# Patient Record
Sex: MALE | Race: WHITE | Employment: OTHER | URBAN - METROPOLITAN AREA
[De-identification: names, ages, dates, MRNs, and addresses within clinical notes are randomized per-mention and may not be internally consistent; named-entity substitution may affect disease eponyms.]

---

## 2018-01-01 ENCOUNTER — TELEPHONE (OUTPATIENT)
Dept: FAMILY MEDICINE | Facility: CLINIC | Age: 63
End: 2018-01-01

## 2018-01-01 ENCOUNTER — PATIENT OUTREACH (OUTPATIENT)
Dept: CARE COORDINATION | Facility: CLINIC | Age: 63
End: 2018-01-01

## 2018-01-01 ENCOUNTER — HOSPITAL ENCOUNTER (OUTPATIENT)
Facility: CLINIC | Age: 63
Setting detail: OBSERVATION
Discharge: HOME | End: 2018-10-17
Attending: EMERGENCY MEDICINE | Admitting: FAMILY MEDICINE
Payer: COMMERCIAL

## 2018-01-01 ENCOUNTER — TELEPHONE (OUTPATIENT)
Dept: PALLIATIVE MEDICINE | Facility: CLINIC | Age: 63
End: 2018-01-01

## 2018-01-01 ENCOUNTER — DOCUMENTATION ONLY (OUTPATIENT)
Dept: OTHER | Facility: CLINIC | Age: 63
End: 2018-01-01

## 2018-01-01 ENCOUNTER — APPOINTMENT (OUTPATIENT)
Dept: GENERAL RADIOLOGY | Facility: CLINIC | Age: 63
End: 2018-01-01
Attending: EMERGENCY MEDICINE
Payer: COMMERCIAL

## 2018-01-01 ENCOUNTER — APPOINTMENT (OUTPATIENT)
Dept: CT IMAGING | Facility: CLINIC | Age: 63
End: 2018-01-01
Attending: EMERGENCY MEDICINE
Payer: COMMERCIAL

## 2018-01-01 ENCOUNTER — OFFICE VISIT (OUTPATIENT)
Dept: FAMILY MEDICINE | Facility: CLINIC | Age: 63
End: 2018-01-01
Payer: COMMERCIAL

## 2018-01-01 VITALS
WEIGHT: 139.8 LBS | SYSTOLIC BLOOD PRESSURE: 122 MMHG | HEART RATE: 83 BPM | RESPIRATION RATE: 18 BRPM | BODY MASS INDEX: 19.5 KG/M2 | TEMPERATURE: 97.2 F | OXYGEN SATURATION: 94 % | DIASTOLIC BLOOD PRESSURE: 82 MMHG

## 2018-01-01 VITALS
RESPIRATION RATE: 18 BRPM | SYSTOLIC BLOOD PRESSURE: 122 MMHG | HEART RATE: 64 BPM | WEIGHT: 140 LBS | BODY MASS INDEX: 19.6 KG/M2 | OXYGEN SATURATION: 94 % | TEMPERATURE: 98 F | HEIGHT: 71 IN | DIASTOLIC BLOOD PRESSURE: 76 MMHG

## 2018-01-01 DIAGNOSIS — R07.9 INTERMITTENT CHEST PAIN: ICD-10-CM

## 2018-01-01 DIAGNOSIS — C79.9 METASTATIC CANCER (H): Primary | ICD-10-CM

## 2018-01-01 DIAGNOSIS — C34.90 STAGE IV SQUAMOUS CELL CARCINOMA OF LUNG, UNSPECIFIED LATERALITY (H): Primary | ICD-10-CM

## 2018-01-01 DIAGNOSIS — M62.81 GENERALIZED MUSCLE WEAKNESS: ICD-10-CM

## 2018-01-01 DIAGNOSIS — R11.0 NAUSEA: ICD-10-CM

## 2018-01-01 LAB
ALBUMIN SERPL-MCNC: 3.5 G/DL (ref 3.4–5)
ALBUMIN SERPL-MCNC: 3.6 G/DL (ref 3.4–5)
ALP SERPL-CCNC: 60 U/L (ref 40–150)
ALP SERPL-CCNC: 62 U/L (ref 40–150)
ALT SERPL W P-5'-P-CCNC: 24 U/L (ref 0–70)
ALT SERPL W P-5'-P-CCNC: 24 U/L (ref 0–70)
ANION GAP SERPL CALCULATED.3IONS-SCNC: 6 MMOL/L (ref 3–14)
AST SERPL W P-5'-P-CCNC: 27 U/L (ref 0–45)
AST SERPL W P-5'-P-CCNC: 27 U/L (ref 0–45)
BASOPHILS # BLD AUTO: 0.1 10E9/L (ref 0–0.2)
BASOPHILS NFR BLD AUTO: 0.9 %
BILIRUB DIRECT SERPL-MCNC: 0.1 MG/DL (ref 0–0.2)
BILIRUB SERPL-MCNC: 0.5 MG/DL (ref 0.2–1.3)
BILIRUB SERPL-MCNC: 0.6 MG/DL (ref 0.2–1.3)
BUN SERPL-MCNC: 15 MG/DL (ref 7–30)
CALCIUM SERPL-MCNC: 9.4 MG/DL (ref 8.5–10.1)
CHLORIDE SERPL-SCNC: 104 MMOL/L (ref 94–109)
CO2 SERPL-SCNC: 27 MMOL/L (ref 20–32)
CREAT SERPL-MCNC: 1.05 MG/DL (ref 0.66–1.25)
DIFFERENTIAL METHOD BLD: ABNORMAL
EOSINOPHIL # BLD AUTO: 0.2 10E9/L (ref 0–0.7)
EOSINOPHIL NFR BLD AUTO: 3.3 %
ERYTHROCYTE [DISTWIDTH] IN BLOOD BY AUTOMATED COUNT: 12.5 % (ref 10–15)
GFR SERPL CREATININE-BSD FRML MDRD: 71 ML/MIN/1.7M2
GLUCOSE SERPL-MCNC: 69 MG/DL (ref 70–99)
HCT VFR BLD AUTO: 40 % (ref 40–53)
HGB BLD-MCNC: 13.7 G/DL (ref 13.3–17.7)
IMM GRANULOCYTES # BLD: 0 10E9/L (ref 0–0.4)
IMM GRANULOCYTES NFR BLD: 0.5 %
INR PPP: 1.06 (ref 0.86–1.14)
LYMPHOCYTES # BLD AUTO: 2.1 10E9/L (ref 0.8–5.3)
LYMPHOCYTES NFR BLD AUTO: 32.4 %
MCH RBC QN AUTO: 32.7 PG (ref 26.5–33)
MCHC RBC AUTO-ENTMCNC: 34.3 G/DL (ref 31.5–36.5)
MCV RBC AUTO: 96 FL (ref 78–100)
MONOCYTES # BLD AUTO: 1.3 10E9/L (ref 0–1.3)
MONOCYTES NFR BLD AUTO: 19.6 %
NEUTROPHILS # BLD AUTO: 2.8 10E9/L (ref 1.6–8.3)
NEUTROPHILS NFR BLD AUTO: 43.3 %
NRBC # BLD AUTO: 0 10*3/UL
NRBC BLD AUTO-RTO: 0 /100
PLATELET # BLD AUTO: 305 10E9/L (ref 150–450)
POTASSIUM SERPL-SCNC: 4.4 MMOL/L (ref 3.4–5.3)
PROT SERPL-MCNC: 7.1 G/DL (ref 6.8–8.8)
PROT SERPL-MCNC: 7.1 G/DL (ref 6.8–8.8)
RBC # BLD AUTO: 4.19 10E12/L (ref 4.4–5.9)
SODIUM SERPL-SCNC: 137 MMOL/L (ref 133–144)
T4 FREE SERPL-MCNC: 0.76 NG/DL (ref 0.76–1.46)
TROPONIN I SERPL-MCNC: <0.015 UG/L (ref 0–0.04)
TSH SERPL DL<=0.005 MIU/L-ACNC: 0.02 MU/L (ref 0.4–4)
WBC # BLD AUTO: 6.4 10E9/L (ref 4–11)

## 2018-01-01 PROCEDURE — 99285 EMERGENCY DEPT VISIT HI MDM: CPT | Mod: 25 | Performed by: EMERGENCY MEDICINE

## 2018-01-01 PROCEDURE — 93308 TTE F-UP OR LMTD: CPT | Mod: 26 | Performed by: EMERGENCY MEDICINE

## 2018-01-01 PROCEDURE — 99205 OFFICE O/P NEW HI 60 MIN: CPT | Performed by: NURSE PRACTITIONER

## 2018-01-01 PROCEDURE — 99358 PROLONG SERVICE W/O CONTACT: CPT | Performed by: NURSE PRACTITIONER

## 2018-01-01 PROCEDURE — 25000128 H RX IP 250 OP 636: Performed by: EMERGENCY MEDICINE

## 2018-01-01 PROCEDURE — 99219 ZZC INITIAL OBSERVATION CARE,LEVL II: CPT | Performed by: FAMILY MEDICINE

## 2018-01-01 PROCEDURE — 71046 X-RAY EXAM CHEST 2 VIEWS: CPT

## 2018-01-01 PROCEDURE — 99495 TRANSJ CARE MGMT MOD F2F 14D: CPT | Performed by: FAMILY MEDICINE

## 2018-01-01 PROCEDURE — 99217 ZZC OBSERVATION CARE DISCHARGE: CPT | Performed by: INTERNAL MEDICINE

## 2018-01-01 PROCEDURE — 71260 CT THORAX DX C+: CPT

## 2018-01-01 PROCEDURE — 80076 HEPATIC FUNCTION PANEL: CPT | Performed by: FAMILY MEDICINE

## 2018-01-01 PROCEDURE — G0378 HOSPITAL OBSERVATION PER HR: HCPCS

## 2018-01-01 PROCEDURE — 25000125 ZZHC RX 250: Performed by: EMERGENCY MEDICINE

## 2018-01-01 PROCEDURE — 93308 TTE F-UP OR LMTD: CPT | Performed by: EMERGENCY MEDICINE

## 2018-01-01 PROCEDURE — 25000132 ZZH RX MED GY IP 250 OP 250 PS 637: Performed by: FAMILY MEDICINE

## 2018-01-01 PROCEDURE — 84439 ASSAY OF FREE THYROXINE: CPT | Performed by: EMERGENCY MEDICINE

## 2018-01-01 PROCEDURE — 84484 ASSAY OF TROPONIN QUANT: CPT | Performed by: EMERGENCY MEDICINE

## 2018-01-01 PROCEDURE — 93005 ELECTROCARDIOGRAM TRACING: CPT | Performed by: EMERGENCY MEDICINE

## 2018-01-01 PROCEDURE — 84443 ASSAY THYROID STIM HORMONE: CPT | Performed by: EMERGENCY MEDICINE

## 2018-01-01 PROCEDURE — 85610 PROTHROMBIN TIME: CPT | Performed by: FAMILY MEDICINE

## 2018-01-01 PROCEDURE — 85025 COMPLETE CBC W/AUTO DIFF WBC: CPT | Performed by: EMERGENCY MEDICINE

## 2018-01-01 PROCEDURE — 93010 ELECTROCARDIOGRAM REPORT: CPT | Mod: 59 | Performed by: EMERGENCY MEDICINE

## 2018-01-01 PROCEDURE — 80053 COMPREHEN METABOLIC PANEL: CPT | Performed by: EMERGENCY MEDICINE

## 2018-01-01 RX ORDER — NALOXONE HYDROCHLORIDE 0.4 MG/ML
.1-.4 INJECTION, SOLUTION INTRAMUSCULAR; INTRAVENOUS; SUBCUTANEOUS
Status: DISCONTINUED | OUTPATIENT
Start: 2018-01-01 | End: 2018-01-01 | Stop reason: HOSPADM

## 2018-01-01 RX ORDER — OXYCODONE HYDROCHLORIDE 5 MG/1
5 TABLET ORAL EVERY 4 HOURS PRN
Status: DISCONTINUED | OUTPATIENT
Start: 2018-01-01 | End: 2018-01-01 | Stop reason: HOSPADM

## 2018-01-01 RX ORDER — ONDANSETRON 4 MG/1
4 TABLET, FILM COATED ORAL EVERY 4 HOURS PRN
Qty: 40 TABLET | Refills: 3 | Status: SHIPPED | OUTPATIENT
Start: 2018-01-01

## 2018-01-01 RX ORDER — OXYCODONE HYDROCHLORIDE 5 MG/1
5 TABLET ORAL EVERY 4 HOURS PRN
Qty: 40 TABLET | Refills: 0 | Status: SHIPPED | OUTPATIENT
Start: 2018-01-01

## 2018-01-01 RX ORDER — IBUPROFEN 600 MG/1
600 TABLET, FILM COATED ORAL EVERY 6 HOURS PRN
Qty: 120 TABLET | Refills: 1 | Status: SHIPPED | OUTPATIENT
Start: 2018-01-01

## 2018-01-01 RX ORDER — ONDANSETRON 2 MG/ML
4 INJECTION INTRAMUSCULAR; INTRAVENOUS EVERY 6 HOURS PRN
Status: DISCONTINUED | OUTPATIENT
Start: 2018-01-01 | End: 2018-01-01 | Stop reason: HOSPADM

## 2018-01-01 RX ORDER — PROCHLORPERAZINE 25 MG
25 SUPPOSITORY, RECTAL RECTAL EVERY 12 HOURS PRN
Status: DISCONTINUED | OUTPATIENT
Start: 2018-01-01 | End: 2018-01-01 | Stop reason: HOSPADM

## 2018-01-01 RX ORDER — ONDANSETRON 4 MG/1
4 TABLET, ORALLY DISINTEGRATING ORAL EVERY 6 HOURS PRN
Status: DISCONTINUED | OUTPATIENT
Start: 2018-01-01 | End: 2018-01-01 | Stop reason: HOSPADM

## 2018-01-01 RX ORDER — PROCHLORPERAZINE MALEATE 10 MG
10 TABLET ORAL EVERY 6 HOURS PRN
Status: DISCONTINUED | OUTPATIENT
Start: 2018-01-01 | End: 2018-01-01 | Stop reason: HOSPADM

## 2018-01-01 RX ORDER — POLYETHYLENE GLYCOL 3350 17 G/17G
1 POWDER, FOR SOLUTION ORAL DAILY
Qty: 510 G | Refills: 3 | Status: SHIPPED | OUTPATIENT
Start: 2018-01-01

## 2018-01-01 RX ORDER — IOPAMIDOL 755 MG/ML
67 INJECTION, SOLUTION INTRAVASCULAR ONCE
Status: COMPLETED | OUTPATIENT
Start: 2018-01-01 | End: 2018-01-01

## 2018-01-01 RX ORDER — ACETAMINOPHEN 325 MG/1
650 TABLET ORAL EVERY 4 HOURS PRN
Status: DISCONTINUED | OUTPATIENT
Start: 2018-01-01 | End: 2018-01-01 | Stop reason: HOSPADM

## 2018-01-01 RX ORDER — IBUPROFEN 600 MG/1
600 TABLET, FILM COATED ORAL EVERY 6 HOURS PRN
Status: DISCONTINUED | OUTPATIENT
Start: 2018-01-01 | End: 2018-01-01 | Stop reason: HOSPADM

## 2018-01-01 RX ADMIN — OXYCODONE HYDROCHLORIDE 5 MG: 5 TABLET ORAL at 08:10

## 2018-01-01 RX ADMIN — IOPAMIDOL 67 ML: 755 INJECTION, SOLUTION INTRAVENOUS at 17:16

## 2018-01-01 RX ADMIN — SODIUM CHLORIDE 94 ML: 9 INJECTION, SOLUTION INTRAVENOUS at 17:17

## 2018-01-01 RX ADMIN — OXYCODONE HYDROCHLORIDE 5 MG: 5 TABLET ORAL at 22:15

## 2018-01-01 ASSESSMENT — PAIN SCALES - GENERAL: PAINLEVEL: NO PAIN (0)

## 2018-01-01 ASSESSMENT — ACTIVITIES OF DAILY LIVING (ADL)
DEPENDENT_IADLS:: INDEPENDENT

## 2018-10-16 PROBLEM — R07.9 INTERMITTENT CHEST PAIN: Status: ACTIVE | Noted: 2018-01-01

## 2018-10-16 PROBLEM — C79.9 METASTATIC CANCER (H): Status: ACTIVE | Noted: 2018-01-01

## 2018-10-16 NOTE — LETTER
Transition Communication Hand-off for Care Transitions to Next Level of Care Provider    Name: Dejon Faye  : 1955  MRN #: [id number]  Primary Care Provider: Babatunde Dubose  Primary Care MD Name:  (Shun)  Primary Clinic: [address] [place] [id number]  Primary Care Clinic Name:  ([facility])  Reason for Hospitalization:  Generalized muscle weakness [M62.81]  Intermittent chest pain [R07.9]  Admit Date/Time: 10/16/2018  2:53 PM  Discharge Date: 10/17/18  Payor Source: Payor: MEDICA / Plan: MEDICA CHOICE / Product Type: Indemnity /     Readmission Assessment Measure (WILL) Risk Score/category: none           Reason for Communication Hand-off Referral: Fragility    Discharge Plan: home       Concern for non-adherence with plan of care:   Y/N yes  Follow-up specialty is recommended: No    Follow-up plan:  Future Appointments  Date Time Provider Department Center   10/19/2018 11:00 AM Josh Burton MD [facility]             Key Recommendations:  Pt is discharging home today. Pt is planning on establishing new PCP in [place] at , appt made prior to discharge. Pt has not had much medical care in the past years. Pt has a dx of cancer.     Natasha Arevalo MSW, LICSW, [phone]      AVS/Discharge Summary is the source of truth; this is a helpful guide for improved communication of patient story

## 2018-10-16 NOTE — TELEPHONE ENCOUNTER
Doug has not been seen for 20 years.  He made an appointment with Dr. Burton tomorrow for numbness in his legs and some episodes of chest pain.  He has also lost weight.  Please triage.  [facility]  Clinic Station

## 2018-10-16 NOTE — IP AVS SNAPSHOT
[facility]    [address] [facility] [address]    Phone:  [phone]    Fax:  [phone]                                       After Visit Summary   10/16/2018    Dejon Faye    MRN: [id number]           After Visit Summary Signature Page     I have received my discharge instructions, and my questions have been answered. I have discussed any challenges I see with this plan with the nurse or doctor.    ..........................................................................................................................................  Patient/Patient Representative Signature      ..........................................................................................................................................  Patient Representative Print Name and Relationship to Patient    ..................................................               ................................................  Date                                   Time    ..........................................................................................................................................  Reviewed by Signature/Title    ...................................................              ..............................................  Date                                               Time          22EPIC Rev 08/18

## 2018-10-16 NOTE — ED NOTES
CP off an on for 4 week, no CP today, No SOA, feeling of generalized weakness for 1 mo. Today is the first visit for this. No hx of cardiac issues.

## 2018-10-16 NOTE — ED PROVIDER NOTES
History     Chief Complaint   Patient presents with     Generalized Weakness     Abdominal Pain     HPI  Dejon Faye is a 63 year old male who presents for 4 weeks of progressive weakness and intermittent chest pain.  The patient says over the past 4 weeks he has had 3 episodes of left-sided chest pain, described as pressure, lasts for several minutes at a time.  When the pain comes on it is severe and he becomes diaphoretic.  It passes without any intervention.  This is happened with activity and without activity.  No pain currently, last episode was 2 days ago.  He denies any chest pain now, no shortness of breath.  He denies fever, chills, headache, abdominal pain, nausea, vomiting, diarrhea, bloody stools, dysuria, or rash.    Problem List:    Patient Active Problem List    Diagnosis Date Noted     Tobacco use disorder 01/23/2006     Priority: Medium     Essential hypertension, benign 07/26/2005     Priority: Medium     Pure hypercholesterolemia 07/26/2005     Priority: Medium        Past Medical History:    Past Medical History:   Diagnosis Date     HYPERTENSION NOS      POSTTRAUMATIC STRESS DISORDER        Past Surgical History:    Past Surgical History:   Procedure Laterality Date     SURGICAL HISTORY OF -   1989    vasectomy       Family History:    Family History   Problem Relation Age of Onset     Respiratory Mother      copd     Alcohol/Drug Mother      Cerebrovascular Disease Father      C.A.D. Father      Diabetes Father      Circulatory Father      DVT     Alzheimer Disease Maternal Grandmother      dementia     Hypertension Brother      Lipids Brother      Diabetes Sister      type 2       Social History:  Marital Status:   [2]  Social History   Substance Use Topics     Smoking status: Current Every Day Smoker     Packs/day: 1.50     Years: 30.00     Types: Cigarettes     Smokeless tobacco: Not on file     Alcohol use No        Medications:      No current outpatient prescriptions on  "file.      Review of Systems  Pertinent positives and negatives listed in the HPI, all other systems reviewed and are negative.    Physical Exam   BP: 143/89  Pulse: 79  Heart Rate: 65  Temp: 98  F (36.7  C)  Resp: 16  Height: 180.3 cm (5' 11\")  Weight: 63.5 kg (140 lb)  SpO2: 98 %      Physical Exam   Constitutional: He is oriented to person, place, and time. He appears well-developed and well-nourished. He appears distressed.   HENT:   Head: Normocephalic and atraumatic.   Right Ear: External ear normal.   Left Ear: External ear normal.   Nose: Nose normal.   Eyes: Conjunctivae are normal. No scleral icterus.   Neck: Normal range of motion.   Cardiovascular: Normal rate and regular rhythm.    No murmur heard.  Pulses:       Radial pulses are 2+ on the right side, and 2+ on the left side.        Posterior tibial pulses are 2+ on the right side, and 2+ on the left side.   Pulmonary/Chest: Effort normal. No stridor. No respiratory distress.   Abdominal: Soft. He exhibits no distension. There is no tenderness. There is no rebound and no guarding.   Neurological: He is alert and oriented to person, place, and time. No cranial nerve deficit. Gait normal.   Left lower extremity: 5/5 strength with hip flexion, hip extension, knee flexion, knee extension, dorsiflexion, and plantar flexion  Right lower extremity: 5/5 strength with hip flexion, hip extension, knee flexion, knee extension, dorsiflexion, and plantar flexion    Skin: Skin is warm and dry. He is not diaphoretic.   Psychiatric: He has a normal mood and affect. His behavior is normal.   Nursing note and vitals reviewed.      ED Course     ED Course     Procedures    Results for orders placed during the hospital encounter of 10/16/18   POC US ECHO LIMITED    [facility] Procedure Note      Limited Bedside ED Cardiac Ultrasound:    PROCEDURE: PERFORMED BY: Dr. South Bravo  INDICATIONS/SYMPTOM:  Syncope, epigastric pain  PROBE: Cardiac " phased array probe  BODY LOCATION: Chest  FINDINGS:   The ultrasound was performed utilizing the parasternal long axis, parasternal short axis and apical 4 chamber views.  Cardiac contractility:  Present  Gross estimation of cardiac kinesis: normal  Pericardial Effusion:  Small  RV:LV ratio: LV > RV  INTERPRETATION:    Chamber size and motion were grossly normal with LV > RV, normal cardiac kinesis.  Trace pericardial effusion was found. .  IMAGE DOCUMENTATION: Images were archived to PACs system.                  EKG Interpretation:      Interpreted by South Bravo  Time reviewed: 1525  Symptoms at time of EKG: Weakness   Rhythm: sinus   Rate: 67  Axis: Left Axis Deviation  Ectopy: none  Conduction: right bundle branch block (complete)  ST Segments/ T Waves: No acute ischemic changes  Q Waves: III and aVf  Comparison to prior: New     Clinical Impression: Sinus rhythm with right bundle branch block      Critical Care time:  none               Results for orders placed or performed during the hospital encounter of 10/16/18 (from the past 24 hour(s))   CBC with platelets, differential   Result Value Ref Range    WBC 6.4 4.0 - 11.0 10e9/L    RBC Count 4.19 (L) 4.4 - 5.9 10e12/L    Hemoglobin 13.7 13.3 - 17.7 g/dL    Hematocrit 40.0 40.0 - 53.0 %    MCV 96 78 - 100 fl    MCH 32.7 26.5 - 33.0 pg    MCHC 34.3 31.5 - 36.5 g/dL    RDW 12.5 10.0 - 15.0 %    Platelet Count 305 150 - 450 10e9/L    Diff Method Automated Method     % Neutrophils 43.3 %    % Lymphocytes 32.4 %    % Monocytes 19.6 %    % Eosinophils 3.3 %    % Basophils 0.9 %    % Immature Granulocytes 0.5 %    Nucleated RBCs 0 0 /100    Absolute Neutrophil 2.8 1.6 - 8.3 10e9/L    Absolute Lymphocytes 2.1 0.8 - 5.3 10e9/L    Absolute Monocytes 1.3 0.0 - 1.3 10e9/L    Absolute Eosinophils 0.2 0.0 - 0.7 10e9/L    Absolute Basophils 0.1 0.0 - 0.2 10e9/L    Abs Immature Granulocytes 0.0 0 - 0.4 10e9/L    Absolute Nucleated RBC 0.0    Comprehensive metabolic  panel   Result Value Ref Range    Sodium 137 133 - 144 mmol/L    Potassium 4.4 3.4 - 5.3 mmol/L    Chloride 104 94 - 109 mmol/L    Carbon Dioxide 27 20 - 32 mmol/L    Anion Gap 6 3 - 14 mmol/L    Glucose 69 (L) 70 - 99 mg/dL    Urea Nitrogen 15 7 - 30 mg/dL    Creatinine 1.05 0.66 - 1.25 mg/dL    GFR Estimate 71 >60 mL/min/1.7m2    GFR Estimate If Black 86 >60 mL/min/1.7m2    Calcium 9.4 8.5 - 10.1 mg/dL    Bilirubin Total 0.6 0.2 - 1.3 mg/dL    Albumin 3.5 3.4 - 5.0 g/dL    Protein Total 7.1 6.8 - 8.8 g/dL    Alkaline Phosphatase 62 40 - 150 U/L    ALT 24 0 - 70 U/L    AST 27 0 - 45 U/L   Troponin I   Result Value Ref Range    Troponin I ES <0.015 0.000 - 0.045 ug/L   TSH with free T4 reflex   Result Value Ref Range    TSH 0.02 (L) 0.40 - 4.00 mU/L   T4 free   Result Value Ref Range    T4 Free 0.76 0.76 - 1.46 ng/dL   POC US ECHO LIMITED    Impression    [facility] Procedure Note      Limited Bedside ED Cardiac Ultrasound:    PROCEDURE: PERFORMED BY: Dr. South Bravo  INDICATIONS/SYMPTOM:  Syncope, epigastric pain  PROBE: Cardiac phased array probe  BODY LOCATION: Chest  FINDINGS:   The ultrasound was performed utilizing the parasternal long axis, parasternal short axis and apical 4 chamber views.  Cardiac contractility:  Present  Gross estimation of cardiac kinesis: normal  Pericardial Effusion:  Small  RV:LV ratio: LV > RV  INTERPRETATION:    Chamber size and motion were grossly normal with LV > RV, normal cardiac kinesis.  Trace pericardial effusion was found. .  IMAGE DOCUMENTATION: Images were archived to PACs system.        XR Chest 2 Views    Narrative    CHEST TWO VIEWS    10/16/2018 4:30 PM     HISTORY: Weakness, intermittent left-sided chest pain.    COMPARISON: Previously performed chest CT and x-ray are not available  for comparison.      Impression    IMPRESSION: Lungs are well-inflated. A 2.9 cm groundglass nodule is  present within the right upper lobe. Recommend correlation with  prior  chest CT. Lungs are otherwise clear. No evidence of edema or effusion.  Heart size is normal. Mild wedging of multiple mid thoracic vertebral  bodies is likely chronic.    ABRAM LAZARO MD       Medications   iopamidol (ISOVUE-370) solution 67 mL (not administered)   Saline Flush (not administered)       Assessments & Plan (with Medical Decision Making)   63-year-old male who presents for intermittent chest pain and generalized weakness.  Temperature is 36.7 C, heart 79, SPO2 is 98% on room air.  Hemoglobin 13.7 which is reassuring, no signs of anemia as a cause of symptoms.  This in the clinic 10,000 EKG sinus rhythm with right bundle branch block is in inferior Q waves.  Troponin is normal.  TSH is low but T4 free is normal making thyroid disease unlikely.  Electrolytes are within normal limits.  Bedside ultrasound shows good cardiac function with trace pericardial effusion.  Chest x-ray is negative for infiltrate, normal thorax but there is a nodule within the right upper lobe.  A CT of the chest is obtained to better evaluate this and is pending.  Given his smoking history this is concerning for lung cancer.  Given the patient's intermittent chest pain over the past several weeks, it is prudent to admit the patient to the hospital for close monitoring and possible stress test tomorrow and coordination of care.  I discussed the case with the on-call hospitalist, Dr. Ley, who agrees to admit the patient to his service.    I have reviewed the nursing notes.    I have reviewed the findings, diagnosis, plan and need for follow up with the patient.       New Prescriptions    No medications on file       Final diagnoses:   Intermittent chest pain   Generalized muscle weakness       10/16/2018   [facility] EMERGENCY DEPARTMENT     South Bravo MD  10/16/18 2879

## 2018-10-16 NOTE — ED NOTES
"..Patient has  [place] to Observation  order. Patient has been given the Observation brochure -  What does Observation mean to me.\"  Patient has been given the opportunity to ask questions about observation status and their plan of care.      Hugo Cardenas  "

## 2018-10-16 NOTE — ED NOTES
"Pt has multiple complaints.  For past 4 weeks pt c/o numbness and weakness in legs.  3 episodes of cp with soa and diaphoretic in the last few weeks.  Not eating well due to nausea with eating.  Weight loss over last few months.  Pt states he has not \"felt well\" over last month or two but wife states that he has slowly declined over last year.  In ER pt denies cp or soa.    "

## 2018-10-16 NOTE — TELEPHONE ENCOUNTER
Dejon Faye is a 63 year old male who calls with chest pain.  Five days ago was diaphoretic when had the chest pain. Was sitting at the kitchen table, got warm and sweaty and lasted about five minutes.     PRESENTING PROBLEM:  Chest pain and leg numbness/tingling (was moving gravel in the driveway).  Nausea for 5 days.      NURSING ASSESSMENT:  Patient complains of chest pain, irregular heart beats and fatigue.  Onset:  5 days ago.  Numbness in legs 3 weeks.  Both shoulders hurt and this is not a new symptom.  Right thumb hurts all the time, had crushing injury years ago.  Pain is characterized as achey and dull   Severity 1 out of 10, mild and off and on  Located left chest   Radiates to none.  Duration intermittent.  Associated symptoms nausea and cough.  Exacerbated by no known provoking events.  Relieved by none.  Cardiac risk factors: smoker.  Associated Medical History: dad had a couple of MI and open heart surgery.  Brother had a stents placed  Allergies: No Known Allergies    MEDICATIONS:  Taking medication(s) as prescribed? N/A  Taking over the counter medication(s) ?N/A  Any medication side effects? Not Applicable    Any barriers to taking medication(s) as prescribed?  No  Medication(s) improving/managing symptoms?  N/A  Medication reconciliation completed: N/A    Last exam/Treatment:  2-6-2007    NURSING PLAN: Nursing advice to patient go to the ED    RECOMMENDED DISPOSITION:  To ED, another person to drive   Will comply with recommendation: Yes  If further questions/concerns or if symptoms do not improve, worsen or new symptoms develop, call your PCP or [facility] as soon as possible.          Manisha Christy RN

## 2018-10-16 NOTE — ED NOTES
Pt placed on cardiac monitor, NSR, no ectopy noted. Family at bedside, no needs at this time. VSS.

## 2018-10-17 NOTE — LETTER
Health Care Home - Access Care Plan    About Me  Patient Name:  Dejon Faye    YOB: 1955  Age:                             63 year old   Kvng MRN:            [id number] Telephone Information:     Home Phone [phone]   Mobile [phone]       Address:    [address] [place] [address] Email address:  [email or web address]      Emergency Contact(s)  Name Relationship Lgl Grd Work Phone Home Phone Mobile Phone   1. LILI ADAMS* Spouse  [phone]              Health Maintenance: Routine Health maintenance Reviewed: Due/Overdue   Health Maintenance Due   Topic Date Due     PHQ-2 Q1 YR  01/04/1966     TETANUS IMMUNIZATION (SYSTEM ASSIGNED)  01/04/1972     HIV SCREEN (SYSTEM ASSIGNED)  01/04/1972     COLON CANCER SCREEN (SYSTEM ASSIGNED)  01/04/2004     ADVANCE DIRECTIVE PLANNING Q5 YRS  01/04/2009     LIPID SCREEN Q5 YR MALE (SYSTEM ASSIGNED)  02/06/2012     INFLUENZA VACCINE (1) 09/01/2018         My Access Plan  Medical Emergency 911   Questions or concerns during clinic hours Primary Clinic Line, I will call the clinic directly: [facility] - [phone]   24 Hour Appointment Line [phone] or  [phone]) (toll free)   24 Hour Nurse Line [phone] (toll free)   Questions or concerns outside clinic hours 24 Hour Appointment Line, I will call the after-hours on-call line:   [facility] [phone] or [phone]) (toll-free)   Preferred Urgent Care [facility], [phone]   Preferred Hospital [place], [place]  [phone]   Preferred Pharmacy [facility] [address] [facility]     Behavioral Health Crisis Line The National Suicide Prevention Lifeline at 1-933.555.1983 or 911     My Care Team Members  Patient Care Team       Relationship Specialty Notifications Start End    Josh Burton MD PCP -  General Family Practice All results, Admissions 10/18/18     Phone: [phone] Fax: [phone]         [address] [facility] [address]    Janay Suresh, RN Clinic Care Coordinator Primary Care - CC Admissions 10/17/18     Phone: [phone] Fax: [phone]        Penelope Stapleton APRN CNP Nurse Practitioner Nurse Practitioner Admissions 10/18/18     Phone: [phone] Fax: [phone] [address] [facility] [address]           My Medical and Care Information  Problem List   Patient Active Problem List   Diagnosis     Essential hypertension, benign     Pure hypercholesterolemia     Tobacco use disorder     Intermittent chest pain     Metastatic cancer (H)      Current Medications and Allergies:  See printed Medication Report

## 2018-10-17 NOTE — PLAN OF CARE
Problem: Patient Care Overview  Goal: Plan of Care/Patient Progress Review  Outcome: Improving  Patient denied any pain over night. Denies any nausea.  Up with stand by assist to bathroom. Using call light appropriate. Vital signs stable, afebrle

## 2018-10-17 NOTE — PROGRESS NOTES
Skin affirmation note    Admitting nurse completed full skin assessment, Jonathan score and Jonathan interventions. This writer agrees with the initial skin assessment findings.

## 2018-10-17 NOTE — PLAN OF CARE
"Problem: Patient Care Overview  Goal: Plan of Care/Patient Progress Review  Outcome: Improving  A&O. Obs. Minimal to no reports of pain. Pt reported feelings of having no energy, chest pain and generalized weakness and not always trusting his legs anymore. One reported episode of feeling nauseous after taking Oxy on an empty stomach, resolved shortly after. Wife is staying the night in the bedroom. CT showed lesions on liver, bilateral lungs, and bones; Pt and wife informed of this for the first time this evening. Palliative care consult set of tomorrow. /68  Pulse 81  Temp 98  F (36.7  C) (Oral)  Resp 18  Ht 1.803 m (5' 11\")  Wt 63.5 kg (140 lb)  SpO2 96%  BMI 19.53 kg/m2. Will continue to monitor.       "

## 2018-10-17 NOTE — PLAN OF CARE
Problem: Patient Care Overview  Goal: Plan of Care/Patient Progress Review  Outcome: Adequate for Discharge Date Met: 10/17/18  [facility] DISCHARGE NOTE    Patient discharged to home at 11:15 AM via wheel chair. Accompanied by spouse and staff. Discharge instructions reviewed with patient and spouse, opportunity offered to ask questions. Prescriptions sent with patient to fill . All belongings sent with patient.    Jeane Stapleton

## 2018-10-17 NOTE — DISCHARGE SUMMARY
[facility]    Discharge Summary  Hospital Medicine    Date of Admission:  10/16/2018  Date of Discharge:  10/17/2018   Discharging Provider: Eric Gabriel MD  Date of Service: 10/17/2018     Primary Care     Babatunde Dubose  [address] [facility] [address]      Identification and Chief Compaint: Dejon Faye is a 63 year old male who presented on 10/16/2018 with complaint of 4 weeks of progressive weakness and intermittent chest pain.    Discharge Diagnoses       Metastatic cancer (H), unknown primary    Intermittent chest pain, atypical, suspect related to cancer    Possible bilateral lower extremity neuropathy      Discharge Disposition   Discharged to home    Discharge Orders     Follow-up and recommended labs and tests    Establish care with new primary care provider in [place] in next 1-2 weeks.     Activity   Your activity upon discharge: activity as tolerated     Diet   Follow this diet upon discharge:     Regular Diet Adult           Further instructions from your care team       From Vasquez Stapleton, Palliative Care NP, Cell [phone]      delroy Camacho wishes to you and Hugo.  Perri to meet you both.    Call [facility] and ask for a family doctor appointment BEFORE you run out of medications.  That doctor can get you set up with hospice when you're ready.  Hospice can provide a hospital med and all comfort meds at no cost to you provided your insurance covers hospice, and        Discharge Medications   Current Discharge Medication List      START taking these medications    Details   ibuprofen (ADVIL/MOTRIN) 600 MG tablet Take 1 tablet (600 mg) by mouth every 6 hours as needed for moderate pain or fever  Qty: 120 tablet, Refills: 1    Associated Diagnoses: Metastatic cancer (H)      ondansetron (ZOFRAN) 4 MG tablet Take 1 tablet (4 mg) by mouth every 4 hours as needed for nausea or vomiting  Qty: 40 tablet, Refills: 3    Associated Diagnoses:  Metastatic cancer (H); Nausea      oxyCODONE IR (ROXICODONE) 5 MG tablet Take 1 tablet (5 mg) by mouth every 4 hours as needed for moderate to severe pain  Qty: 40 tablet, Refills: 0    Associated Diagnoses: Metastatic cancer (H)      polyethylene glycol (MIRALAX) powder Take 17 g (1 capful) by mouth daily  Qty: 510 g, Refills: 3    Associated Diagnoses: Metastatic cancer (H)           Allergies   No Known Allergies    Consultations This Hospital Stay   Palliative care    Significant Results and Procedures   Procedures    None    Data   Results for orders placed or performed during the hospital encounter of 10/16/18   XR Chest 2 Views    Narrative    CHEST TWO VIEWS    10/16/2018 4:30 PM     HISTORY: Weakness, intermittent left-sided chest pain.    COMPARISON: Previously performed chest CT and x-ray are not available  for comparison.      Impression    IMPRESSION: Lungs are well-inflated. A 2.9 cm groundglass nodule is  present within the right upper lobe. Recommend correlation with prior  chest CT. Lungs are otherwise clear. No evidence of edema or effusion.  Heart size is normal. Mild wedging of multiple mid thoracic vertebral  bodies is likely chronic.    ABRAM LAZARO MD   POC US ECHO LIMITED    Impression    [facility] Procedure Note      Limited Bedside ED Cardiac Ultrasound:    PROCEDURE: PERFORMED BY: Dr. South Bravo  INDICATIONS/SYMPTOM:  Syncope, epigastric pain  PROBE: Cardiac phased array probe  BODY LOCATION: Chest  FINDINGS:   The ultrasound was performed utilizing the parasternal long axis, parasternal short axis and apical 4 chamber views.  Cardiac contractility:  Present  Gross estimation of cardiac kinesis: normal  Pericardial Effusion:  Small  RV:LV ratio: LV > RV  INTERPRETATION:    Chamber size and motion were grossly normal with LV > RV, normal cardiac kinesis.  Trace pericardial effusion was found. .  IMAGE DOCUMENTATION: Images were archived to PACs system.        Chest CT -  IV contrast only - PE protocol    Narrative    CT CHEST PULMONARY EMBOLISM WITH CONTRAST  10/16/2018 5:31 PM     HISTORY: Right lung mass, heavy smoker.      COMPARISON: None.    TECHNIQUE: Volumetric helical acquisition of CT images of the chest  from the lung apices to the kidneys were acquired after the  administration of 67mL Isovue -370  IV contrast. Radiation dose for  this scan was reduced using automated exposure control, adjustment of  the mA and/or kV according to patient size, or iterative  reconstruction technique.    FINDINGS: There is no pulmonary embolism. Bilateral pulmonary lesions  are noted, there is a cavitary lesion in the right lung with  peripheral spiculations measuring 3.4 x 2.0 cm. There is a suprahilar  spiculated lesion on the left measuring 2.4 x 2.1 cm. A few additional  smaller nodules noted. Mediastinal and right hilar adenopathy noted.  Lymph node in the right hilum measures 2.9 x 2.8 cm. Subcarinal and  pretracheal adenopathy also noted, the pretracheal lymph node  measuring 2.4 x 1.8 cm. The heart is not enlarged. Thoracic aorta is  atherosclerotic without evidence of dissection or aneurysm. There is  no pleural or pericardial effusion.  There is no pneumothorax.  Prominent enlargement of the left adrenal gland compatible with  metastatic lesion. Multifocal liver lesions, the largest in the  anterior left lobe measuring 2.7 cm. Bone sclerotic lesions including  T11, T8, and T3. Remainder of the visualized upper abdomen is  unremarkable.      Impression    IMPRESSION:   1. No pulmonary embolism demonstrated.  2. No thoracic aortic dissection or aneurysm.   3. Bilateral pulmonary lesions compatible with malignancy.  4. Multifocal liver lesions compatible with metastatic disease.  5. Sclerotic bony lesions compatible with metastatic disease.    OBDULIO ESPINOZA MD       History of Present Illness   (From H&P) This patient is a 63 year old  male with a significant past medical  "history of tobacco abuse but no other known health concerns who has not pursued medical care in the past who presents with 4 weeks of progressive weakness and intermittent chest pain.  Chest pain has been inconsistent, no clearly exertional or not, lasts a few minutes then resolves spontaneously.   Per his spouse he rarely takes any medications, even ibuprofen and tylenol.  Currently pain free.  Wife thinks he's been declining gradually for much longer than the past month.  No fever or chills.  No dyspnea.  Legs have been a bit more weak in general the past few weeks, bilaterally, no focal weakness or acute changes.  Apatite hasn't been as good recently, has been loosing some weight but not sure exactly how much.  Currently he feels at baseline other than his slowly progressive weakness.      Hospital Course   Dejon Faye was admitted on 10/16/2018.  The following problems were addressed during his hospitalization:      Metastatic cancer (H)   New diagnosis by CT. Unknown primary but particularly given tobacco history lung is most likely.  CT shows lesions in lungs, liver, bone and possibly adrenal gland.  Patient aware of diagnosis and does not want to pursue any treatment.  He and his wife say they both have always decided that they would not want any chemo or radiation treatment of they were diagnosed with cancer.  Patient says he's \"been ready to meet God\" for a long time.  Says his only goal is to be kept comfortable.  Would like to meet with palliative care tomorrow to discuss his goals/care going forward- Vasquez Stapleton aware and order placed. In particular he'd like to discuss who he should follow with - discussed following with his primary care provider to start vs with palliative care vs with hospice.  Of note, the one thing he is considering is getting a tissue biopsy for diagnosis - per his report, he has hepatitis C which was work acquired and if this is a liver primary then it could be a work " related malignancy.  They will discuss if they want to proceed with biopsy - would need to discuss with radiology, but I assume a liver lesion would be preferable.  No pain at present, but may want to discharge with a few oxycodone in case assuming he will develop more episodes of chest pain as below down the road.  Will check an INR and hepatic panel, added on if able.    Palliative care was consulted, and the patient has good pain control.  We discussed potential biopsy percutaneously to learn what this is an would do this with the expectation of asking oncology for prognosis and potential treatments.  At this time the patient does not want to pursue this but will reconsider upon establishing primary care in the next week or so.  I did review the films with radiology and there is a peripheral lung lesion and also a liver lesion that are possible accessible targets for percutaneous biopsy.  I did explain to the patient that there may be palliative treatments that would not be harsh and also there is potential need for localized treatment in the future if a particular tumor is causing pain or other symptoms, and he expressed understanding.       Intermittent chest pain   Non-exertional.  Present intermittently over the past month, ECG shows no acute changes and initial troponin is negative - has bony mets including multiple thoracic vertebrae.  Overall this does not look cardiac and patient pursuing comfort and would not want intervention regardless.  Stopping tele, will not follow serial troponins.  Chest pain and weakness consistent with pain from thoracic bony mets with weakness due to progressive widespread malignancy.     Pain is well controlled at the time of discharge.       Bilateral lower extremity tingling, neuropathy   Patient notes recent onset of tingling type discomfort in his feet and lower legs bilaterally.  He denies issues with bowel or bladder.  No lower extremity weakness.  No back pain.   Strength in the lower extremities and patellar reflexes within normal limits.  Discussed potentially imaging spine regarding potential for tumor compression, but the patient is not interested at this time.  Should he have progressive symptoms, I would certainly consider imaging the potential to radiate a tumor if that is the issue.    Pending Results   Unresulted Labs Ordered in the Past 30 Days of this Admission     Date and Time Order Name Status Description    10/16/2018 1500 T4 free In process           Physical Exam   Temp:  [98  F (36.7  C)-98.5  F (36.9  C)] 98  F (36.7  C)  Pulse:  [64-89] 64  Heart Rate:  [63-73] 69  Resp:  [16-18] 18  BP: (102-143)/(68-93) 122/76  SpO2:  [94 %-98 %] 94 %  Vitals:    10/16/18 1411   Weight: 63.5 kg (140 lb)       Constitutional: alert and oriented, NAD, appropriate.  Normal affect.   CV: Regular  Respiratory: CTA bilaterally  GI: Soft, non-tender   Skin: Warm and dry  Neuro: Dorsiflexion, knee extension, hip flexion strength within normal limits bilaterally. DTRs 1+ both patella tendons.     The discharge plan was discussed with the patient and his wife at bedside, and they expressed understanding.  Patient was quite adamant he did not want any further workup today.  He is agreeable to establish care at the [facility] [address] [facility] in the next week or so.     Total time on this discharge was 25 minutes.    Eric Gabriel MD  [facility]

## 2018-10-17 NOTE — PROGRESS NOTES
Transition Communication Hand-off for Care Transitions to Next Level of Care Provider    Name: Dejon Faye  : 1955  MRN #: [id number]  Primary Care Provider: Babatunde Dubose  Primary Care MD Name:  (Shun)  Primary Clinic: [address] [place] [id number]  Primary Care Clinic Name:  ([facility])  Reason for Hospitalization:  Generalized muscle weakness [M62.81]  Intermittent chest pain [R07.9]  Admit Date/Time: 10/16/2018  2:53 PM  Discharge Date: 10/17/18  Payor Source: Payor: MEDICA / Plan: MEDICA CHOICE / Product Type: Indemnity /     Readmission Assessment Measure (WILL) Risk Score/category: none           Reason for Communication Hand-off Referral: Fragility    Discharge Plan: home       Concern for non-adherence with plan of care:   Y/N yes  Follow-up specialty is recommended: No    Follow-up plan:  Future Appointments  Date Time Provider Department Center   10/19/2018 11:00 AM Josh Burton MD [facility]             Key Recommendations:  Pt is discharging home today. Pt is planning on establishing new PCP in [place] at , appt made prior to discharge. Pt has not had much medical care in the past years. Pt has a dx of cancer.     Natasha Arevalo MSW, LICSW, [phone]      AVS/Discharge Summary is the source of truth; this is a helpful guide for improved communication of patient story

## 2018-10-17 NOTE — H&P
"[facility] History and Physical    Dejon Faye MRN# [id number]   Age: 63 year old YOB: 1955     Date of Admission:  10/16/2018      Primary care provider: Babatunde Dubose          Assessment and Plan:       Metastatic cancer (H)  10/16/2018 -- unknown primary but particularly given tobacco history lung is most likely.  CT shows lesions in lungs, liver, bone and possibly adrenal gland.  Patient aware of diagnosis and does not want to pursue any treatment.  He and his wife say they both have always decided that they would not want any chemo or radiation treatment of they were diagnosed with cancer.  Patient says he's \"been ready to meet God\" for a long time.  Says his only goal is to be kept comfortable.  Would like to meet with palliative care tomorrow to discuss his goals/care going forward- Vasquez Stapleton aware and order placed. In particular he'd like to discuss who he should follow with - discussed following with his primary care provider to start vs with palliative care vs with hospice.  Of note, the one thing he is considering is getting a tissue biopsy for diagnosis - per his report, he has hepatitis C which was work acquired and if this is a liver primary then it could be a work related malignancy.  They will discuss if they want to proceed with biopsy - would need to discuss with radiology, but I assume a liver lesion would be preferable.  No pain at present, but may want to discharge with a few oxycodone in case assuming he will develop more episodes of chest pain as below down the road.  Will check an INR and hepatic panel, added on if able.        Intermittent chest pain  10/16/2018 -- non-exertional.  Present intermittently over the past month, ECG shows no acute changes and initial troponin is negative - has bony mets including multiple thoracic vertebrae.  Overall this does not look cardiac and patient pursuing comfort and would not want intervention regardless.  Stopping " tele, will not follow serial troponins.  Chest pain and weakness consistent with pain from thoracic bony mets with weakness due to progressive widespread malignancy.      Tobacco abuse  10/16/2018 -- down to 4 cigarettes per day - ordered nicotine gum prn while here - discussed with patient, he doesn't think he'll try to quit at this point given prognosis - I agreed that this is a reasonable decision given his advanced cancer already and goals of comfort.       Prophylaxis  Baseline mobility, ambulate - reassess if unable to discharge tomorrow.      Lines  PIV    Disposition  Anticipate discharge home tomorrow after consultation with Vasquez Stapleton (palliative care) and likely physical therapy evaluation for weakness unless ambulating well in the AM in which case we could cancel the physical therapy consult.                Chief Complaint:   Chest pain, generalized weakness  History is obtained from the patient and his spouse          History of Present Illness:   This patient is a 63 year old  male with a significant past medical history of tobacco abuse but no other known health concerns who has not pursued medical care in the past who presents with 4 weeks of progressive weakness and intermittent chest pain.  Chest pain has been inconsistent, no clearly exertional or not, lasts a few minutes then resolves spontaneously.   Per his spouse he rarely takes any medications, even ibuprofen and tylenol.  Currently pain free.  Wife thinks he's been declining gradually for much longer than the past month.  No fever or chills.  No dyspnea.  Legs have been a bit more weak in general the past few weeks, bilaterally, no focal weakness or acute changes.  Apatite hasn't been as good recently, has been loosing some weight but not sure exactly how much.  Currently he feels at baseline other than his slowly progressive weakness.      Smoking down to 4 cigarettes per day, no alcohol or illicit drug use.                    "Past Medical History:   I have reviewed this patient's past medical history.  Patient Active Problem List    Diagnosis Date Noted     Intermittent chest pain 10/16/2018     Priority: Medium     Metastatic cancer (H) 10/16/2018     Priority: Medium     Tobacco use disorder 2006     Priority: Medium     Essential hypertension, benign 2005     Priority: Medium     Pure hypercholesterolemia 2005     Priority: Medium              Past Surgical History:   I have reviewed this patient's past surgical history   Past Surgical History:   Procedure Laterality Date     SURGICAL HISTORY OF -       vasectomy             Social History:   I have reviewed this patient's social history   Social History   Substance Use Topics     Smoking status: Current Every Day Smoker     Packs/day: 1.50     Years: 30.00     Types: Cigarettes     Smokeless tobacco: Not on file     Alcohol use No             Family History:   I have reviewed this patient's family history  Family History   Problem Relation Age of Onset     Respiratory Mother      copd     Alcohol/Drug Mother      Cerebrovascular Disease Father      C.A.D. Father      Diabetes Father      Circulatory Father      DVT     Alzheimer Disease Maternal Grandmother      dementia     Hypertension Brother      Lipids Brother      Diabetes Sister      type 2             Immunizations:   Immunizations are current          Allergies:   No Known Allergies          Medications:     No prescriptions prior to admission.             Review of Systems:    ROS: 10 point ROS neg other than the symptoms noted above in the HPI.             Physical Exam:   Blood pressure 133/77, pulse 89, temperature 98.5  F (36.9  C), temperature source Oral, resp. rate 18, height 1.803 m (5' 11\"), weight 63.5 kg (140 lb), SpO2 97 %.  Temperatures:  Current - Temp: 98.5  F (36.9  C); Max - Temp  Av.3  F (36.8  C)  Min: 98  F (36.7  C)  Max: 98.5  F (36.9  C)  Respiration range: Resp  Av  " Min: 16  Max: 18  Pulse range: Pulse  Av  Min: 79  Max: 89  Blood pressure range: Systolic (24hrs), Av , Min:102 , Max:143   ; Diastolic (24hrs), Av, Min:77, Max:93    Pulse oximetry range: SpO2  Av.6 %  Min: 95 %  Max: 98 %  No intake or output data in the 24 hours ending 10/16/18 1936  EXAM:  General: awake and alert, NAD, oriented x 3  Head: normocephalic  Neck: unremarkable, no lymphadenopathy   HEENT: oropharynx pink and moist    Heart: Regular rate and rhythm, no murmurs, rubs, or gallops  Lungs: clear to auscultation bilaterally with good air movement throughout  Abdomen: soft, non-tender, no masses or organomegaly  Extremities: no edema in lower extremities   Skin unremarkable.             Data:     Results for orders placed or performed during the hospital encounter of 10/16/18 (from the past 24 hour(s))   CBC with platelets, differential   Result Value Ref Range    WBC 6.4 4.0 - 11.0 10e9/L    RBC Count 4.19 (L) 4.4 - 5.9 10e12/L    Hemoglobin 13.7 13.3 - 17.7 g/dL    Hematocrit 40.0 40.0 - 53.0 %    MCV 96 78 - 100 fl    MCH 32.7 26.5 - 33.0 pg    MCHC 34.3 31.5 - 36.5 g/dL    RDW 12.5 10.0 - 15.0 %    Platelet Count 305 150 - 450 10e9/L    Diff Method Automated Method     % Neutrophils 43.3 %    % Lymphocytes 32.4 %    % Monocytes 19.6 %    % Eosinophils 3.3 %    % Basophils 0.9 %    % Immature Granulocytes 0.5 %    Nucleated RBCs 0 0 /100    Absolute Neutrophil 2.8 1.6 - 8.3 10e9/L    Absolute Lymphocytes 2.1 0.8 - 5.3 10e9/L    Absolute Monocytes 1.3 0.0 - 1.3 10e9/L    Absolute Eosinophils 0.2 0.0 - 0.7 10e9/L    Absolute Basophils 0.1 0.0 - 0.2 10e9/L    Abs Immature Granulocytes 0.0 0 - 0.4 10e9/L    Absolute Nucleated RBC 0.0    Comprehensive metabolic panel   Result Value Ref Range    Sodium 137 133 - 144 mmol/L    Potassium 4.4 3.4 - 5.3 mmol/L    Chloride 104 94 - 109 mmol/L    Carbon Dioxide 27 20 - 32 mmol/L    Anion Gap 6 3 - 14 mmol/L    Glucose 69 (L) 70 - 99 mg/dL     Urea Nitrogen 15 7 - 30 mg/dL    Creatinine 1.05 0.66 - 1.25 mg/dL    GFR Estimate 71 >60 mL/min/1.7m2    GFR Estimate If Black 86 >60 mL/min/1.7m2    Calcium 9.4 8.5 - 10.1 mg/dL    Bilirubin Total 0.6 0.2 - 1.3 mg/dL    Albumin 3.5 3.4 - 5.0 g/dL    Protein Total 7.1 6.8 - 8.8 g/dL    Alkaline Phosphatase 62 40 - 150 U/L    ALT 24 0 - 70 U/L    AST 27 0 - 45 U/L   Troponin I   Result Value Ref Range    Troponin I ES <0.015 0.000 - 0.045 ug/L   TSH with free T4 reflex   Result Value Ref Range    TSH 0.02 (L) 0.40 - 4.00 mU/L   T4 free   Result Value Ref Range    T4 Free 0.76 0.76 - 1.46 ng/dL   POC US ECHO LIMITED    Impression    [facility] Procedure Note      Limited Bedside ED Cardiac Ultrasound:    PROCEDURE: PERFORMED BY: Dr. South Bravo  INDICATIONS/SYMPTOM:  Syncope, epigastric pain  PROBE: Cardiac phased array probe  BODY LOCATION: Chest  FINDINGS:   The ultrasound was performed utilizing the parasternal long axis, parasternal short axis and apical 4 chamber views.  Cardiac contractility:  Present  Gross estimation of cardiac kinesis: normal  Pericardial Effusion:  Small  RV:LV ratio: LV > RV  INTERPRETATION:    Chamber size and motion were grossly normal with LV > RV, normal cardiac kinesis.  Trace pericardial effusion was found. .  IMAGE DOCUMENTATION: Images were archived to PACs system.        XR Chest 2 Views    Narrative    CHEST TWO VIEWS    10/16/2018 4:30 PM     HISTORY: Weakness, intermittent left-sided chest pain.    COMPARISON: Previously performed chest CT and x-ray are not available  for comparison.      Impression    IMPRESSION: Lungs are well-inflated. A 2.9 cm groundglass nodule is  present within the right upper lobe. Recommend correlation with prior  chest CT. Lungs are otherwise clear. No evidence of edema or effusion.  Heart size is normal. Mild wedging of multiple mid thoracic vertebral  bodies is likely chronic.    ABRAM LAZARO MD   Chest CT - IV contrast only - PE  protocol    Narrative    CT CHEST PULMONARY EMBOLISM WITH CONTRAST  10/16/2018 5:31 PM     HISTORY: Right lung mass, heavy smoker.      COMPARISON: None.    TECHNIQUE: Volumetric helical acquisition of CT images of the chest  from the lung apices to the kidneys were acquired after the  administration of 67mL Isovue -370  IV contrast. Radiation dose for  this scan was reduced using automated exposure control, adjustment of  the mA and/or kV according to patient size, or iterative  reconstruction technique.    FINDINGS: There is no pulmonary embolism. Bilateral pulmonary lesions  are noted, there is a cavitary lesion in the right lung with  peripheral spiculations measuring 3.4 x 2.0 cm. There is a suprahilar  spiculated lesion on the left measuring 2.4 x 2.1 cm. A few additional  smaller nodules noted. Mediastinal and right hilar adenopathy noted.  Lymph node in the right hilum measures 2.9 x 2.8 cm. Subcarinal and  pretracheal adenopathy also noted, the pretracheal lymph node  measuring 2.4 x 1.8 cm. The heart is not enlarged. Thoracic aorta is  atherosclerotic without evidence of dissection or aneurysm. There is  no pleural or pericardial effusion.  There is no pneumothorax.  Prominent enlargement of the left adrenal gland compatible with  metastatic lesion. Multifocal liver lesions, the largest in the  anterior left lobe measuring 2.7 cm. Bone sclerotic lesions including  T11, T8, and T3. Remainder of the visualized upper abdomen is  unremarkable.      Impression    IMPRESSION:   1. No pulmonary embolism demonstrated.  2. No thoracic aortic dissection or aneurysm.   3. Bilateral pulmonary lesions compatible with malignancy.  4. Multifocal liver lesions compatible with metastatic disease.  5. Sclerotic bony lesions compatible with metastatic disease.    OBDULIO ESPINOZA MD           Attestation:  I have reviewed today's vital signs, notes, medications, labs and imaging.  Amount of time performed on this history and  physical: 45 minutes.        Daniele Ley MD

## 2018-10-17 NOTE — PROGRESS NOTES
[place] [facility] [place]    Patient admitted to room 2309 at approximately 1845 via cart from emergency room. Patient was accompanied by transport tech.     Verbal SBAR report received from Penelope SEGURA prior to patient arrival.     Patient ambulated to bed independently. Patient alert and oriented X 3. The patient is not having any pain. 0-10 Pain Scale: 0. Admission vital signs: Blood pressure 106/68, pulse 81, temperature 98  F (36.7  C), temperature source Oral, resp. rate 18, height 1.803 m (5' 11\"), weight 63.5 kg (140 lb), SpO2 96 %. Patient and spouse were oriented to plan of care, call light, bed controls, tv, telephone, bathroom and visiting hours.     Risk Assessment    The following safety risks were identified during admission: fall. Yellow risk band applied: NO.     Skin Initial Assessment    This writer admitted this patient and completed a full skin assessment and Jonathan score in the Adult PCS flowsheet. Appropriate interventions initiated as needed.     Secondary skin check completed by Maria D RN.    Skin  Inspection of bony prominences: Full  Inspection under devices: Full  Skin WDL:  WDL except, characteristics  Skin Temperature: warm  Skin Moisture: dry  Skin Integrity: scar(s), scab(s), bruise(s) (Left foot/ankle and back have scabs.)    Jonathan Risk Assessment  Sensory Perception: 4-->no impairment  Moisture: 4-->rarely moist  Activity: 3-->walks occasionally  Mobility: 3-->slightly limited  Nutrition: 2-->probably inadequate  Friction and Shear: 3-->no apparent problem  Jonathan Score: 19  Bed Support Surface: Atmos Air mattress  Reassessed using Bed Algorithm: No  Jonathan Intervention(s) Implemented: encouraged fluids, patient /family education on pressure injury prevention    Lore Barrera"

## 2018-10-17 NOTE — DISCHARGE INSTRUCTIONS
From Vasquez Stapleton, Palliative Care NP, Cell [phone]      delroy Camacho wishes to you and Hugo.  Perri to meet you both.    Call [facility] and ask for a family doctor appointment BEFORE you run out of medications.  That doctor can get you set up with hospice when you're ready.  Hospice can provide a hospital med and all comfort meds at no cost to you provided your insurance covers hospice, and I've never yet run into insurance that didn't cover hospice.     Take Miralax (polyethylene glycol) every day to prevent constipation caused by the Oxycodone.    I suggest to ALL my patients that they record the date and time of each Oxycodone dose.  That helps me, at least, figure out if and when you can tolerate a slow release opioid.    Remember too that the Ibuprofen will probably work better on bone pain than the oxycodone.      If you need refills before you're able to get hooked up with a new family doctor, call me on my personal cell number above and I'll take care of you.      Ondansetron is for nausea.     OK to call if you have questions.  Leave a message if I can't  as I shouldn't  if I'm with another patient, even though I did it when I was in your room.      God Bless!  PS:  I don't biopsies hurt that much, just a little, and I think it would be OK to take 2 Oxy's if the pain is bad.

## 2018-10-17 NOTE — CONSULTS
"[facility]    Palliative Care Consultation--Inpatient  Admission Date: 10/16/2018   Visit Date: October 17, 2018  PCP: Babatunde Dubose   Requested by: Daniele Ley MD      HISTORY of PRESENT ILLNESS:  Dejon Faye is a 63 year old year old male who hasn't seen a physician for 11 years.  He presented to the ED with a h/o gradually progressive weakness and intermittent severe L-sided chest pain associated with diaphoresis, several episodes over the preceding few weeks.  Workup in the ED was negative for elevated Troponins or echocardiographic/EKG evidence of ACS.  CT of the chest eventually demonstrated that he had evidence of metastatic disease in both lungs, L adrenal, liver and thoracic spine.  He was admitted to observation status.  He was referred to Palliative Care for exploration of goals of care and symptom support.      ASSESSMENT & PLAN:    # Episodic chest wall pain, severe for several minutes before spontaneously resolving, 2o newly discovered metatstatic cancer with lung being the most likely primary site in light of his tobacco history.  Oxycodone effective w/o inducing undue confusion, though it is associated with nausea.   > continue Oxycodone 5 mg q4h PRN post discharge    # Bilateral neck and shoulder pains, temporally associated with   > Ibuprofen 600 mg q6h PRN; advised pt that Ibuprofen more effective than Oxy for bone pain    # Unplanned weight loss, anorexia  > certified patient on [place] Dept [employer] for medical cannabis and referred pt and wife to that web site    # Advance Care Planning:  > Understanding of condition:  Knows he's going to die with or without chemo/XRT and he and wife both prefer, for now, to not be \"guinea pigs\" and don't want chemo.  However, I tried to encourage him to keep an open mind about palliative radiation for bone pain or spinal cord compression, should they develop  > Decisional capacity:  intact  > Code status:  Did not have time to address " this  > Health Care Directive: NO  > POLST:  No    # Goals of Care:  > return home asap  > control pain  > he doesn't object to hospice, but sees no need for it now.  Of note, I had far less time with him than I typically do to discuss these matters.  He nearly signed himself out AMA, that's how very anxious he was to leave the hospital, and he attributed that to his h/o PTSD      Thank you for the opportunity to be of service to this patient and family.      Vasquez Stapleton CNP ([place])  Palliative Care  Private cell:  [phone]     Face to face:   [phone] and [id number], 60 min, > 50% spent discussing  prognosis and appropriate use of medications for symptom management.   Non face to face:   [id number], [phone], 2898-2616, 60 min, > 50% spent coordinating care with  attending, nursing and Care Transitions, writing discharge prescriptions and instructions, and registering him on the [facility] medical cannabis web site.       = = = = = = = = = = = = = = = =      PROBLEM LIST  Patient Active Problem List   Diagnosis     Essential hypertension, benign     Pure hypercholesterolemia     Tobacco use disorder     Intermittent chest pain     Metastatic cancer (H)       PAST MEDICAL HISTORY  Past Medical History:   Diagnosis Date     Posttraumatic stress disorder      Unspecified essential hypertension        PAST SURGICAL HISTORY  Past Surgical History:   Procedure Laterality Date     SURGICAL HISTORY OF -   1989    vasectomy       FAMILY MEDICAL HISTORY  Family History   Problem Relation Age of Onset     Respiratory Mother      copd     Alcohol/Drug Mother      Cerebrovascular Disease Father      C.A.D. 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  Met his wife of 34+ years when she was just 16.  They have 2 daughters.  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  He said he witnessed many deaths, many of which were horrible and associated with images he cannot erase from his mind.  He eventually was diagnosed with PTSD due to work-related experiences (never served in the ), and was granted disability.  He has no regrets.  He states his wife developed some mental health issues after being in an auto accident years ago, so he no longer allows her to drive.  He views the process of being given chemo akin to allowing yourself to be used as a guinea pig, and wants nothing of it.  His goal is to be kept comfortable, free of pain.  I was unable to dissuade him from his beliefs even after explaining that the goal of PALLIATIVE chemo/immunotherapy/radiotherapy is to minimize the pain and discomfort associated with cancer. He openly professes great nakia in God.  His concern now is focused on planning for his wife's care and living conditions following his death, and he wishes to remain at home.  He isn't yet interested in hospice.  His wife understands that hospice would come to him when he does enroll.     Vasquez Stapleton ([place]) [facility]    Palliative Care    [facility] cell:  [phone]        SPIRITUAL HISTORY  Profound, deep nakia in God and in God's providence.      ALLERGIES  No Known Allergies    MEDICATIONS  Medications Prior to Admission    No current facility-administered medications on file prior to encounter.   No current outpatient prescriptions on file prior to encounter.    Current Medications      PRN Medications  acetaminophen, " "ibuprofen, naloxone, nicotine polacrilex, ondansetron **OR** ondansetron, oxyCODONE IR, prochlorperazine **OR** prochlorperazine **OR** prochlorperazine      REVIEW OF SYSTEMS  Weak legs, gradually progressive.  Some difficulty in starting stream.  Denies fecal/urinary incontinence.  No appetite, losing weight unintentionally.  Not as strong.  Wife states he hates to ask for help.  Patient is very open about his nakia in God, absence of fear of death.  OxyIR provided adequate relief from pain w/o making him confused.  It did make him a bit sleepy.  Never has had trouble with constipation--very regular.  Open to my advice about taking Miralax daily to prevent constipation from opioids.  Has had chronic limitations in ROM of both shoulders and feels crepitus in neck with movement.      Performance Assessment:    Palliative Performance Scale, v.2     70%  Significant evidence of disease.  Full/normal self-care & LOC; normal or reduced intake, reduced ambulation and activity/work.        PHYSICAL EXAMINATION  Patient Vitals for the past 24 hrs:   BP Temp Temp src Pulse Heart Rate Resp SpO2 Height Weight   10/17/18 0749 122/76 98  F (36.7  C) Oral 64 - 18 94 % - -   10/17/18 0259 107/72 98.2  F (36.8  C) Oral 79 - 16 97 % - -   10/16/18 2325 106/68 98  F (36.7  C) Oral 81 - 18 96 % - -   10/16/18 1932 - - - - 69 - - - -   10/16/18 1855 133/77 98.5  F (36.9  C) Oral 89 - 18 97 % - -   10/16/18 1745 127/83 - - - - - 95 % - -   10/16/18 1730 - - - - - - 96 % - -   10/16/18 1700 102/77 - - - 64 - 96 % - -   10/16/18 1645 121/79 - - - 63 - 95 % - -   10/16/18 1630 132/79 - - - 73 - 97 % - -   10/16/18 1600 126/88 - - - 67 - 97 % - -   10/16/18 1545 120/80 - - - 65 - 96 % - -   10/16/18 1530 (!) 127/93 - - - 65 - 96 % - -   10/16/18 1515 (!) 122/92 - - - - - 98 % - -   10/16/18 1509 - - - - - - 98 % - -   10/16/18 1508 134/86 - - - - - - - -   10/16/18 1411 143/89 98  F (36.7  C) Oral 79 - 16 98 % 5' 11\" (1.803 m) 140 lb (63.5 " kg)      Wt Readings from Last 5 Encounters:   10/16/18 140 lb (63.5 kg)   02/06/07 183 lb (83 kg)   01/23/06 178 lb (80.7 kg)   07/26/05 176 lb (79.8 kg)     Constitutional:  Awake, alert, oriented, anxious to leave the hospital  Eyes:  Sclera anicteric, PERRL  HEENT:  OP pink, moist  Lymph/Hematologic:  No epitrochlear, axillary, anterior or posterior cervical, or supraclavicular lymphadenopathy is appreciated  Cardiovascular:  RRR  Respiratory:  Diminished, clear  GI: Soft, non-tender, normal bowel sounds, no hepatosplenomegaly  Genitourinary:  deferred  Musculoskeletal:  Spine NT to palpation, I could not appreciate crepitus with neck rotation/flexion, minimal impairment in shoulder elevation, abduction/adduction  Skin:  Warm, dry  Neurological:  B/l patella DTRs brisk, WNL  Psych:  Seemed to become more irritable and anxious as the morning progressed; was dressed and ready to go by 10 am      DATA  ROUTINE ICU LABS (Last four results)  CMP    Recent Labs  Lab 10/16/18  1500      POTASSIUM 4.4   CHLORIDE 104   CO2 27   ANIONGAP 6   GLC 69*   BUN 15   CR 1.05   GFRESTIMATED 71   GFRESTBLACK 86   JASKARAN 9.4   PROTTOTAL 7.1  7.1   ALBUMIN 3.6  3.5   BILITOTAL 0.5  0.6   ALKPHOS 60  62   AST 27  27   ALT 24  24     CBC    Recent Labs  Lab 10/16/18  1500   WBC 6.4   RBC 4.19*   HGB 13.7   HCT 40.0   MCV 96   MCH 32.7   MCHC 34.3   RDW 12.5        INR    Recent Labs  Lab 10/16/18  1500   INR 1.06     Arterial Blood GasNo lab results found in last 7 days.      Recent Results (from the past 48 hour(s))   POC US ECHO LIMITED    [facility] Procedure Note      Limited Bedside ED Cardiac Ultrasound:    PROCEDURE: PERFORMED BY: Dr. South Bravo  INDICATIONS/SYMPTOM:  Syncope, epigastric pain  PROBE: Cardiac phased array probe  BODY LOCATION: Chest  FINDINGS:   The ultrasound was performed utilizing the parasternal long axis, parasternal short axis and apical 4 chamber  views.  Cardiac contractility:  Present  Gross estimation of cardiac kinesis: normal  Pericardial Effusion:  Small  RV:LV ratio: LV > RV  INTERPRETATION:    Chamber size and motion were grossly normal with LV > RV, normal cardiac kinesis.  Trace pericardial effusion was found. .  IMAGE DOCUMENTATION: Images were archived to PACs system.        XR Chest 2 Views    Narrative    CHEST TWO VIEWS    10/16/2018 4:30 PM     HISTORY: Weakness, intermittent left-sided chest pain.    COMPARISON: Previously performed chest CT and x-ray are not available  for comparison.      Impression    IMPRESSION: Lungs are well-inflated. A 2.9 cm groundglass nodule is  present within the right upper lobe. Recommend correlation with prior  chest CT. Lungs are otherwise clear. No evidence of edema or effusion.  Heart size is normal. Mild wedging of multiple mid thoracic vertebral  bodies is likely chronic.    ABRAM LAZARO MD   Chest CT - IV contrast only - PE protocol    Narrative    CT CHEST PULMONARY EMBOLISM WITH CONTRAST  10/16/2018 5:31 PM     HISTORY: Right lung mass, heavy smoker.      COMPARISON: None.    TECHNIQUE: Volumetric helical acquisition of CT images of the chest  from the lung apices to the kidneys were acquired after the  administration of 67mL Isovue -370  IV contrast. Radiation dose for  this scan was reduced using automated exposure control, adjustment of  the mA and/or kV according to patient size, or iterative  reconstruction technique.    FINDINGS: There is no pulmonary embolism. Bilateral pulmonary lesions  are noted, there is a cavitary lesion in the right lung with  peripheral spiculations measuring 3.4 x 2.0 cm. There is a suprahilar  spiculated lesion on the left measuring 2.4 x 2.1 cm. A few additional  smaller nodules noted. Mediastinal and right hilar adenopathy noted.  Lymph node in the right hilum measures 2.9 x 2.8 cm. Subcarinal and  pretracheal adenopathy also noted, the pretracheal lymph  node  measuring 2.4 x 1.8 cm. The heart is not enlarged. Thoracic aorta is  atherosclerotic without evidence of dissection or aneurysm. There is  no pleural or pericardial effusion.  There is no pneumothorax.  Prominent enlargement of the left adrenal gland compatible with  metastatic lesion. Multifocal liver lesions, the largest in the  anterior left lobe measuring 2.7 cm. Bone sclerotic lesions including  T11, T8, and T3. Remainder of the visualized upper abdomen is  unremarkable.      Impression    IMPRESSION:   1. No pulmonary embolism demonstrated.  2. No thoracic aortic dissection or aneurysm.   3. Bilateral pulmonary lesions compatible with malignancy.  4. Multifocal liver lesions compatible with metastatic disease.  5. Sclerotic bony lesions compatible with metastatic disease.    OBDULIO ESPINOZA MD

## 2018-10-17 NOTE — LETTER
[place] CARE COORDINATION  [facility]  [address] [address]  [phone]    October 18, 2018    Dejon Faye  [address] [place] [id number]      Dear Dejon,    I am a clinic care coordinator who works with Josh Burton MD at [facility]. I wanted to thank you for spending the time talking with me.  I wanted to introduce myself and provide you with my contact information so that you can call me with questions or concerns about your health care. Below is a description of clinic care coordination and how I can further assist you.     The clinic care coordinator is a registered nurse and/or  who understand the health care system. The goal of clinic care coordination is to help you manage your health and improve access to the [facility] system in the most efficient manner. The registered nurse can assist you in meeting your health care goals by providing education, coordinating services, and strengthening the communication among your providers. The  can assist you with financial, behavioral, psychosocial, chemical dependency, counseling, and/or psychiatric resources.    Please feel free to contact me at [phone], with any questions or concerns. We at [facility] are focused on providing you with the highest-quality healthcare experience possible and that all starts with you.     Sincerely,     Janay Suresh              Enclosed: I have enclosed a copy of a 24 Hour Access Plan. This has helpful phone numbers for you to call when needed. Please keep this in an easy to access place to use as needed.      I have sent a Consent to Communicate form for you to complete (as you wish) to allow us to discuss/share your personal health information with whomever you choose on your behalf.   I have highlighted the areas for you to review/address.  Please complete all that apply.  Please check the box for medical  "information if you allow the clinic to share personal health information with whomever you choose.  This form must be signed and dated to be valid.  If you check all boxes, please be aware that checking \"nothing\" despite checking other boxes will allow no information to be shared.      If you wish not to have information regarding your mental health, chemical health, AIDS/HIV or sickle cell anemia, then please initial the second bullet point at the end of the form.  If you allow this information to be shared with those noted above on the form, then leave this area blank and do not initial.      This form does not , you can make a change to this document at any time.  I am also sending you out information regarding Palliative care and hospice care. I also am sending out information regarding Health Care Directives. Please let me know if you have any questions or concerns.    "

## 2018-10-17 NOTE — PROGRESS NOTES
Name: Dejon Faye    MRN#: [id number]    Reason for Hospitalization: Generalized muscle weakness [M62.81]  Intermittent chest pain [R07.9]    Discharge Date: 10/17/2018    Patient / Family response to discharge plan: This writer met with pt and pts spouse in regards to discharge planning. Pt lives at home with his wife. Pt has not seen a MD in years. Diagnosis of cancer. Pt was agreeable to establish a PCP on discharge, prefers male at  NB. Appt made.     Other Providers (Care Coordinator, County Services, PCA services etc): No    CTS Hand Off Completed: Yes: completed    PAS #: n/a    WILL Score: none    Future Appointments: Future Appointments  Date Time Provider Department Center   10/19/2018 11:00 AM Josh Burton MD [facility]       Discharge Disposition: home    Discharge Planner   Discharge Plans in progress: home  Barriers to discharge plan: none  Follow up plan: home       Entered by: Natasha Arevalo 10/17/2018 10:37 AM           Natasha Arevalo MSW, LICSW, [facility] [phone]

## 2018-10-18 NOTE — PROGRESS NOTES
Clinic Care Coordination Contact    Clinic Care Coordination Contact  OUTREACH    Referral Information:  Referral Source: IP Handoff    Primary Diagnosis: Oncology    Chief Complaint   Patient presents with     Clinic Care Coordination - Post Hospital     [place] [facility] [place]new diagnosis of metastatic cancer        Universal Utilization: No concerns at this time.  Clinic Utilization  Difficulty keeping appointments:: No  Compliance Concerns: No  No-Show Concerns: No  No PCP office visit in Past Year: Yes  Utilization    Last refreshed: 10/18/2018  7:21 AM:  No Show Count (past year) 0       Last refreshed: 10/18/2018  7:21 AM:  ED visits 0       Last refreshed: 10/18/2018  7:21 AM:  Hospital admissions 1          Current as of: 10/18/2018  7:21 AM             Clinical Concerns:  Care Coordinator RN spoke with patient, he states he is doing fine. He is aware of his appointment tomorrow with Dr. Burton to establish care. He states he is just trying to log into his [employer] account but forgot what his login was and password, Care Coordinator RN was able to assist him with this and get him back up and running. He states he was also approved for the Medical Marijuana program so he is going to go into that system today to get that all set up. He denies having any other questions or concerns at this time.    Current Medical Concerns:    Patient Active Problem List   Diagnosis     Essential hypertension, benign     Pure hypercholesterolemia     Tobacco use disorder     Intermittent chest pain     Metastatic cancer (H)         Current Behavioral Concerns: No concerns at this time.      Education Provided to patient: RN CC educated about Care Coordination Services, discharge instructions, medications reviewed and follow up.         Health Maintenance Reviewed: Due/Overdue   Health Maintenance Due   Topic Date Due     PHQ-2 Q1 YR  01/04/1966     TETANUS IMMUNIZATION (SYSTEM ASSIGNED)  01/04/1972     HIV SCREEN  (SYSTEM ASSIGNED)  01/04/1972     COLON CANCER SCREEN (SYSTEM ASSIGNED)  01/04/2004     ADVANCE DIRECTIVE PLANNING Q5 YRS  01/04/2009     LIPID SCREEN Q5 YR MALE (SYSTEM ASSIGNED)  02/06/2012     INFLUENZA VACCINE (1) 09/01/2018       Clinical Pathway: None    Medication Management:  Patient is independent in medication management.        Functional Status:  Dependent ADLs:: Independent  Dependent IADLs:: Independent  Bed or wheelchair confined:: No  Mobility Status: Independent  Fallen 2 or more times in the past year?: No  Any fall with injury in the past year?: No    Living Situation:  Current living arrangement:: I live in a private home with spouse    Diet/Exercise/Sleep:  Diet:: Regular  Inadequate nutrition (GOAL):: No  Food Insecurity: No  Tube Feeding: No  Exercise:: Yes  How intense was your typical exercise? : Light (like stretching or slow walking)  Inadequate activity/exercise (GOAL):: No  Significant changes in sleep pattern (GOAL): No    Transportation:  Transportation concerns (GOAL):: No  Transportation means:: Regular car     Psychosocial:  Adventist or spiritual beliefs that impact treatment:: No  Mental health DX:: No  Mental health management concern (GOAL):: No  Informal Support system:: Spouse, Family, Friends, Children     Financial/Insurance:   Financial/Insurance concerns (GOAL):: No  No concerns at this time. Patient is on SSDI.     Resources and Interventions:  Current Resources: RN will mail out a letter to pt's home with RN CC contact information, explanation of CC services and patient care plan.     Community Resources: Palliative Care, Other (see comment) (Medical Marijuana)  Supplies used at home:: None  Equipment Currently Used at Home: none    Advance Care Plan/Directive  Advanced Care Plans/Directives on file:: No    Referrals Placed: None    Outreach Frequency: weekly  Future Appointments              Tomorrow Josh Burton MD [facility]           Plan:   Patient will continue to follow treatment plan as directed and follow up with PCP with concerns ongoing.   Care Coordinator RN will f/u in one week to see how he is doing.    Janay Suresh RN, [facility]  RN Care Coordinator  [facility]  Phone # [phone]  10/18/2018 10:45 AM

## 2018-10-19 NOTE — TELEPHONE ENCOUNTER
Chief Complaint   Patient presents with     medical cannabis     received word that he was approved, but having difficulty accessing [facility] web site     Link info provided.    Vasquez Bolden) ONESIMO Stapleton  Palliative Care  Private cell:  [phone]

## 2018-10-19 NOTE — PROGRESS NOTES
SUBJECTIVE:   Dejon Faye is a 63 year old male who presents to clinic today for the following health issues:          Hospital Follow-up Visit:    Hospital/Nursing Home/IP Rehab Facility: [facility]  Date of Admission: 10/16/18  Date of Discharge: 10/17/18  Reason(s) for Admission:     Metastatic cancer (H), unknown primary    Intermittent chest pain, atypical, suspect related to cancer    Possible bilateral lower extremity neuropathy            Problems taking medications regularly:  None       Medication changes since discharge: START: Ibuprofen - PRN, Zofran - PRN, Oxycodone- PRN, Miralax - Daily       Problems adhering to non-medication therapy:  None    Summary of hospitalization:  [facility] discharge summary reviewed  Diagnostic Tests/Treatments reviewed.  Follow up needed: none  Other Healthcare Providers Involved in Patient s Care:         None  Update since discharge: stable.     Post Discharge Medication Reconciliation: discharge medications reconciled, continue medications without change.  Plan of care communicated with patient     Coding guidelines for this visit:  Type of Medical   Decision Making Face-to-Face Visit       within 7 Days of discharge Face-to-Face Visit        within 14 days of discharge   Moderate Complexity [id number]   High Complexity [id number]                  Problem list and histories reviewed & adjusted, as indicated.  Additional history:         Reviewed and updated as needed this visit by clinical staff       Reviewed and updated as needed this visit by Provider         ROS:  CONSTITUTIONAL: NEGATIVE for fever, chills, change in weight  ENT/MOUTH: NEGATIVE for ear, mouth and throat problems  RESP: NEGATIVE for significant cough or SOB  CV: NEGATIVE for chest pain, palpitations or peripheral edema    OBJECTIVE:     /82 (BP Location: Right arm, Patient Position: Chair, Cuff Size: Adult Regular)  Pulse 83  Temp 97.2  F (36.2  C) (Tympanic)   Resp 18  Wt 139 lb 12.8 oz (63.4 kg)  SpO2 94%  BMI 19.5 kg/m2  Body mass index is 19.5 kg/(m^2).  GENERAL: healthy, alert and no distress  NECK: no adenopathy, no asymmetry, masses, or scars and thyroid normal to palpation  RESP: lungs clear to auscultation - no rales, rhonchi or wheezes  CV: regular rate and rhythm, normal S1 S2, no S3 or S4, no murmur, click or rub, no peripheral edema and peripheral pulses strong  ABDOMEN: soft, nontender, no hepatosplenomegaly, no masses and bowel sounds normal  MS: no gross musculoskeletal defects noted, no edema        ASSESSMENT/PLAN:     STAGE 4 CANCER PRIMARY UNKNOWN he has stage IV cancer      With the lesions in his lung liver and bone.  Currently is determined been committed to no further treatment.  There are no diagnoses linked to this encounter.        Josh Burton MD  [facility]

## 2018-10-19 NOTE — NURSING NOTE
"No chief complaint on file.      Initial /82 (BP Location: Right arm, Patient Position: Chair, Cuff Size: Adult Regular)  Pulse 83  Temp 97.2  F (36.2  C) (Tympanic)  Resp 18  Wt 139 lb 12.8 oz (63.4 kg)  SpO2 94%  BMI 19.5 kg/m2 Estimated body mass index is 19.5 kg/(m^2) as calculated from the following:    Height as of 10/16/18: 5' 11\" (1.803 m).    Weight as of this encounter: 139 lb 12.8 oz (63.4 kg).    Patient presents to the clinic using No DME    Health Maintenance that is potentially due pending provider review:  NONE    Ivy Carnes MA  11:31 AM 10/19/2018  .          "

## 2018-10-19 NOTE — PATIENT INSTRUCTIONS
1. Your chart number is [id number]    2. Ok for meds now,    3. Let me know if there is anything you need on marijuana.

## 2018-10-19 NOTE — MR AVS SNAPSHOT
After Visit Summary   10/19/2018    Dejon Faye    MRN: [id number]           Patient Information     Date Of Birth          1955        Visit Information        Provider Department      10/19/2018 11:00 AM Josh Burton MD [facility]        Care Instructions    1. Your chart number is [id number]    2. Ok for meds now,    3. Let me know if there is anything you need on marijuana.           Follow-ups after your visit        Who to contact     If you have questions or need follow up information about today's clinic visit or your schedule please contact [facility] directly at [phone].  Normal or non-critical lab and imaging results will be communicated to you by MyChart, letter or phone within 4 business days after the clinic has received the results. If you do not hear from us within 7 days, please contact the clinic through [employer] or phone. If you have a critical or abnormal lab result, we will notify you by phone as soon as possible.  Submit refill requests through [employer] or call your pharmacy and they will forward the refill request to us. Please allow 3 business days for your refill to be completed.          Additional Information About Your Visit        MyChart Information     [employer] gives you secure access to your electronic health record. If you see a primary care provider, you can also send messages to your care team and make appointments. If you have questions, please call your primary care clinic.  If you do not have a primary care provider, please call [phone] and they will assist you.        Care EveryWhere ID     This is your Care EveryWhere ID. This could be used by other organizations to access your [place] medical records  [id number]        Your Vitals Were     Pulse Temperature Respirations Pulse Oximetry BMI (Body Mass Index)       83 97.2  F (36.2  C) (Tympanic) 18 94% 19.5 kg/m2        Blood Pressure from  Last 3 Encounters:   10/19/18 122/82   10/17/18 122/76   02/06/07 150/90    Weight from Last 3 Encounters:   10/19/18 139 lb 12.8 oz (63.4 kg)   10/16/18 140 lb (63.5 kg)   02/06/07 183 lb (83 kg)              Today, you had the following     No orders found for display       Primary Care Provider Office Phone # Fax #    Josh Burton [phone]       [address] [place] [address]        Equal Access to Services     [facility]: Hadii raissa padilla hadasho Sojeremi, waaxda luqadaha, qaybta kaalmada adeivett, foster valenzuela . So [facility] [phone].    ATENCIÓN: Si habla español, tiene a ya disposición servicios gratuitos de asistencia lingüística. [facility] [place] [phone].    We comply with applicable federal civil rights laws and [place] laws. We do not discriminate on the basis of race, color, national origin, age, disability, sex, sexual orientation, or gender identity.            Thank you!     Thank you for choosing [facility]  for your care. Our goal is always to provide you with excellent care. Hearing back from our patients is one way we can continue to improve our services. Please take a few minutes to complete the written survey that you may receive in the mail after your visit with us. Thank you!             Your Updated Medication List - Protect others around you: Learn how to safely use, store and throw away your medicines at www.disposemymeds.org.          This list is accurate as of 10/19/18 11:48 AM.  Always use your most recent med list.                   Brand Name Dispense Instructions for use Diagnosis    ibuprofen 600 MG tablet    ADVIL/MOTRIN    120 tablet    Take 1 tablet (600 mg) by mouth every 6 hours as needed for moderate pain or fever    Metastatic cancer (H)       ondansetron 4 MG tablet    ZOFRAN    40 tablet    Take 1 tablet (4 mg) by mouth every 4 hours as needed for nausea or vomiting    Metastatic  cancer (H), Nausea       oxyCODONE IR 5 MG tablet    ROXICODONE    40 tablet    Take 1 tablet (5 mg) by mouth every 4 hours as needed for moderate to severe pain    Metastatic cancer (H)       polyethylene glycol powder    MIRALAX    510 g    Take 17 g (1 capful) by mouth daily    Metastatic cancer (H)

## 2018-10-25 NOTE — PROGRESS NOTES
Clinic Care Coordination Contact  Care Team Conversations    Care Coordinator RN spoke with patient, he states he is feeling pretty good. He is just waiting to hear more from [facility] regarding his Medical Marijuana. He states he recently started having tingling sensation down both legs from knees down like they are just waking up from falling asleep. He denies having any questions or concerns for Care Coordinator RN at this time. He will keep Care Coordinator RN updated if something does come up.    Plan:  Patient will continue to follow treatment plan as directed and follow up with PCP with concerns ongoing.   Care Coordinator RN will f/u in 2 weeks to see how he is doing.    Janay Suresh RN, [facility]  RN Care Coordinator  [facility]  Phone # [phone]  10/25/2018 3:08 PM

## 2018-10-31 PROBLEM — Z79.899 MEDICAL CANNABIS USE: Status: ACTIVE | Noted: 2018-01-01

## 2018-11-08 NOTE — PROGRESS NOTES
Clinic Care Coordination Contact  [facility]/Voicemail    Referral Source: IP Handoff    Clinical Data: Care Coordinator Outreach, f/u. Patient admitted to [facility] 11/1/18.    Outreach attempted x 1.  Left message on voicemail with call back information and requested return call.    Plan: Care Coordinator will do no further outreaches at this time.    Janay Suresh RN, [facility]  RN Care Coordinator  [facility]  Phone # [phone]  11/8/2018 12:16 PM

## 2018-11-08 NOTE — PROGRESS NOTES
"Clinic Care Coordination Contact  Care Team Conversations    Care Coordinator RN spoke with Ro  with [facility], she states patient was admitted 11/1/18 after PEDRITO called stating she could smell the \"death smell.\" She states patient is doing fine, is getting comfort cares.    No further Care Coordination needs identified at this time. No follow-up planned.    Janay Suresh RN, [facility]  RN Care Coordinator  [facility]  Phone # [phone]  11/8/2018 12:17 PM        "